# Patient Record
Sex: MALE | Race: WHITE
[De-identification: names, ages, dates, MRNs, and addresses within clinical notes are randomized per-mention and may not be internally consistent; named-entity substitution may affect disease eponyms.]

---

## 2017-12-03 ENCOUNTER — HOSPITAL ENCOUNTER (EMERGENCY)
Dept: HOSPITAL 46 - ED | Age: 52
Discharge: HOME | End: 2017-12-03
Payer: OTHER GOVERNMENT

## 2017-12-03 VITALS — BODY MASS INDEX: 40.09 KG/M2 | WEIGHT: 280.01 LBS | HEIGHT: 70 IN

## 2017-12-03 DIAGNOSIS — R59.0: Primary | ICD-10-CM

## 2017-12-03 DIAGNOSIS — Z79.899: ICD-10-CM

## 2017-12-03 DIAGNOSIS — Z98.890: ICD-10-CM

## 2022-04-18 NOTE — XMS
PreManage Notification: MURRAY MCCLAIN MRN:[id number]
 
Security Information
 
Security Events
No recent Security Events currently on file
 
 
 
CRITERIA MET
------------
- PDMP
 
 
CARE PROVIDERS
-------------------------------------------------------------------------------------
YANIRA MORAES      Nurse Practitioner: Family     Current
 
PHONE: Unknown
-------------------------------------------------------------------------------------
 
Krysta has no Care Guidelines for this patient.
 
EALBERTO VISIT COUNT (12 MO.)
-------------------------------------------------------------------------------------
1 [facility]
-------------------------------------------------------------------------------------
TOTAL 1
-------------------------------------------------------------------------------------
NOTE: Visits indicate total known visits.
 
ED/UCC VISIT TRACKING (12 MO.)
-------------------------------------------------------------------------------------
04/18/2022 12:52
[facility]
 
TYPE: Emergency
 
COMPLAINT:
- HIGH BLOOD SUGAR
-------------------------------------------------------------------------------------
 
 
INPATIENT VISIT TRACKING (12 MO.)
No inpatient visits to display in this time frame
 
[email or web address]

## 2022-04-19 NOTE — XMS
PreManage Notification: MURRAY MCCLAIN MRN:[id number]
 
Security Information
 
Security Events
No recent Security Events currently on file
 
 
 
CRITERIA MET
------------
- [facility] - 2 Visits in 30 Days
- PDMP
 
 
CARE PROVIDERS
-------------------------------------------------------------------------------------
YANIRA MORAES      Nurse Practitioner: Family     Current
 
PHONE: Unknown
-------------------------------------------------------------------------------------
 
Krysta has no Care Guidelines for this patient.
 
EALBERTO VISIT COUNT (12 MO.)
-------------------------------------------------------------------------------------
2 [facility]
-------------------------------------------------------------------------------------
TOTAL 2
-------------------------------------------------------------------------------------
NOTE: Visits indicate total known visits.
 
ED/UCC VISIT TRACKING (12 MO.)
-------------------------------------------------------------------------------------
04/19/2022 20:19
[facility]
 
TYPE: Emergency
 
COMPLAINT:
- DIABETIC BLOOD SUGAR PROBLEM
-------------------------------------------------------------------------------------
04/18/2022 12:52
[facility]
 
TYPE: Emergency
 
COMPLAINT:
- HIGH BLOOD SUGAR
-------------------------------------------------------------------------------------
 
 
INPATIENT VISIT TRACKING (12 MO.)
No inpatient visits to display in this time frame
 
[email or web address]

## 2023-08-25 NOTE — NUR
PT UP DATED ON PLAN OF CARE AND ANTICIPATED TIME OF PROCEEDURE. PT VERBALIZES
UNDERSTANDING AND STATES HIS QUESTIONS HAVE BEEN ANSWERED. PT DENIES
ADDITIONAL REQUESTS OR COMPLAINTS. CALL LIGHT WITHIN REACH. FAMILY AT BEDSIDE.

## 2023-08-25 NOTE — OR
[facility]
                                    [address], [place]  [address]
_________________________________________________________________________________________
                                                                 Signed   
 
 
DATE OF OPERATION:
08/25/2023
 
SURGEON:
Amy Blackwood MD
 
PREOPERATIVE DIAGNOSES:
1. Maternal grandfather with colon cancer age 50. 
2. Triple hemorrhoidectomy in 2019.
3. Personal history of multiple hyperplastic polyps in 2012 at age 47.
 
POSTOPERATIVE DIAGNOSES:
1. Triple hemorrhoidectomy scars.
2. 3 mm rectal polyps x3 at 4-8 cm.
3. 8 mm sessile polyp at 75 cm in left colon.
4. 6 mm sessile polyp at 70 cm in left colon.
 
PROCEDURE:
Colonoscopy with hot biopsy.
 
ESTIMATED BLOOD LOSS:
None.
 
INDICATIONS:
Angel is a 58-year-old gentleman, asked to see me for followup colonoscopy.  He had
multiple hyperplastic polyps removed in 2012 at the age of 47 with Dr. Balaji Slater. His
maternal grandfather had colon cancer at age 50.  Dr. Amy Dorantes also performed a
triple hemorrhoidectomy back in 2019.  He said he gets a little burning around the anus
sometimes when going to the bathroom.  He said he has to use laxatives once in a while.
He does not use a fiber product.  In the office, I had given him a pamphlet on
colonoscopy.  We had reviewed that in detail.  He understands the nature of the test.
There is risk including, but not limited to gas bloating, crampy abdominal pain,
bleeding, perforation requiring surgery, and missed diagnosis.  In addition, we asked
him to use a double bowel prep.  That would consist of one gallon of polyethylene
glycol.  Unfortunately, he only used one-half gallon of the polyethylene glycol.
Nevertheless, his prep was actually pretty good today.  In addition he has to use
tramadol on a daily basis along with a little marijuana to sleep.  He also has a very
full round face, heavy neck, chest and abdomen.  He has a full face beard.  Therefore,
we had asked that we have monitored anesthesia care with propofol infusion.  That proved
to be a mares decision.  He had expressed understanding and wished to proceed. 
 
PROCEDURE NOTE:
 
    Electronically Signed By: AMY BLACKWOOD MD  08/25/23 1043
_________________________________________________________________________________________
PATIENT NAME:     ANGEL MCCLAIN                  
MEDICAL RECORD #: [id number]            OPERATIVE REPORT              
          ACCT #: [id number]  
DATE OF BIRTH:   [birth year]            REPORT #: [id number]      
PHYSICIAN:        AMY BLACKWOOD MD             
PCP:              PITO LISA MD           
REPORT IS CONFIDENTIAL AND NOT TO BE RELEASED WITHOUT AUTHORIZATION
 
 
                                  [facility]
                                    [address], [place]  [address]
_________________________________________________________________________________________
                                                                 Signed   
 
 
Angel was taken into our endoscopy suite and placed in the left lateral decubitus
position.  He was given monitored anesthesia care with propofol infusion per our nurse
anesthetist.  A digital rectal exam was performed and he probably has some very early
pruritus ani.  There were no external hemorrhoids.  He had good sphincter tone.  No
masses.  The scope had been introduced and advanced under direct visualization of the
camera into the cecum itself.  His prep overall was good.  There were several areas of
liquid particulate stool matter.  Most of that was irrigated and suctioned out.  He
really would benefit from one gallon of polyethylene glycol in the future.  We could
easily see the appendiceal orifice and ileocecal valve.  The scope was then slowly
withdrawn.  We took pictures throughout for photodocumentation.  The above-mentioned
polyps were easily removed with the help of hot biopsy forceps.  We could also see
previous polypectomy sites, particularly in the rectum.  He did have some diverticula in
the sigmoid colon.  They were moderate in size, few in number and scattered about.  In
the rectum, the scope had been retroflexed and I can see the three scars from his triple
hemorrhoidectomy in 2019.  There was very little in the way of any internal hemorrhoid
tissue.  After this, the scope was anteflexed.  The gas was suctioned out and the
colonoscope removed.  Angel tolerated the procedure quite well. 
 
RECOMMENDATIONS:
I will see Angel back in my office in 7 to 14 days to review his results.  He will be
on the five year plan due to his family history.  In the future, he should probably use
a double bowel prep. 
 
 
 
            ________________________________________
            Amy Blackwood MD 
 
 
ALB/MODL
Job #:  [id number]
DD:  08/25/2023 09:26:23
DT:  08/25/2023 09:55:30
 
cc:            Amy Blackwood MD 
 
               [facility], [facility] 
 
               Pito Lisa MD
 
 
Copies:  AMY BLACKWOOD MD
 
    Electronically Signed By: AMY BLACKWOOD MD  08/25/23 1043
_________________________________________________________________________________________
PATIENT NAME:     ANGEL MCCLAIN                  
MEDICAL RECORD #: [id number]            OPERATIVE REPORT              
          ACCT #: [id number]  
DATE OF BIRTH:   [birth year]            REPORT #: [id number]      
PHYSICIAN:        AMY BLACKWOOD MD             
PCP:              PITO LISA MD           
REPORT IS CONFIDENTIAL AND NOT TO BE RELEASED WITHOUT AUTHORIZATION
 
 
                                  [address]  [address]
_________________________________________________________________________________________
                                                                 Signed   
 
 
         PITO LISA MD
~
 
 
 
 
 
 
 
 
 
 
 
 
 
 
 
 
 
 
 
 
 
 
 
 
 
 
 
 
 
 
 
 
 
 
 
 
 
 
 
 
 
    Electronically Signed By: AMY BLACKWOOD MD  08/25/23 1043
_________________________________________________________________________________________
PATIENT NAME:     ANGEL MCCLAIN                  
MEDICAL RECORD #: [id number]            OPERATIVE REPORT              
          ACCT #: [id number]  
DATE OF BIRTH:   [birth year]            REPORT #: [id number]      
PHYSICIAN:        AMY BLACKWOOD MD             
PCP:              PITO LISA MD           
REPORT IS CONFIDENTIAL AND NOT TO BE RELEASED WITHOUT AUTHORIZATION

## 2023-08-25 NOTE — NUR
08/25/23 0917 Lily Patel
0911- PT ARRIVES TO PACU NONAROUSABLE TO STIMULI. RESP EVEN AND
UNLABORED. OXYGEN SAT LOW TO MID 90'S ON 2L VIA CO2 NC. PT HAS A HX OF
SLEEP APNEA AND DID NOT BRING CPAP TODAY.

## 2023-08-29 NOTE — PATH
[facility]
                                    [address], [place]  [address]
_________________________________________________________________________________________
                                                                 Signed   
 
 
 
SPECIMEN(S): A RECTAL POLYP AT 4 CM
SPECIMEN(S): B LEFT COLON POLYPS AT 75 CM
SPECIMEN(S): C LEFT COLON POLYP AT 70 CM
 
SPECIMEN SOURCE:
A. RECTAL POLYP AT 4 CM
B. LEFT COLON POLYPS AT 75 CM
C. LEFT COLON POLYP AT 70 CM
 
CLINICAL HISTORY:
Follow-up, history of colon polyps, family history of colon cancer.  Polyps x3.
 
FINAL PATHOLOGIC DIAGNOSIS:
A. Rectal polyp at 4 cm:
-  Hyperplastic polyp (one fragment).
B. Left colon polyps at 75 cm:
-  Hyperplastic polyps (two fragments).
C. Left colon polyp at 70 cm:
-  Hyperplastic polyp (two fragments).
JVR:[facility]
 
MICROSCOPIC EXAMINATION:
Histologic sections of all submitted blocks are examined by light microscopy.  
These findings, together with the gross examination, support the pathologic 
diagnosis. 
 
GROSS DESCRIPTION:
A. The specimen, labeled and designated "MELISSA Mcclain, " and designated on the 
requisition "colon rectum polypectomy at 4 cm," is received in formalin and 
consists of 1 tan soft tissue fragment 
measuring 0.2 x 0.4 cm and submitted entirely in (A1).
B. The specimen, labeled and designated "MELISSA Mcclain, " and designated on the 
requisition "colon, descending/left polypectomy at 75 cm x 2 polyps," is 
received in formalin and consists of 2 tan soft 
polypoid tissue fragments measuring 0.3 x 0.4 cm in greatest dimension.  
Specimens are submitted entirely in (B1). 
C. The specimen, labeled and designated "MELISSA Mcclain, " and designated on the 
requisition "colon, descending left polypectomy at 70 cm," is received in 
formalin and consists of 2 tan soft tissue 
fragments measuring 0.2 x 0.3 cm and submitted entirely in (C1).
[facility]MA  (under the direct supervision of a pathologist)
 
                                                                                    
_________________________________________________________________________________________
PATIENT NAME:     MURRAY MCCLAIN                  
MEDICAL RECORD #: [id number]            PATHOLOGY                     
          ACCT #: [id number]       ACCESSION #: [id number]     
DATE OF BIRTH:   [birth year]            REPORT #: [id number]       
PHYSICIAN:        PHILIP LEMONS              
PCP:              SRIRAM LION MD           
REPORT IS CONFIDENTIAL AND NOT TO BE RELEASED WITHOUT AUTHORIZATION
 
 
                                  [facility]
                                    [address], [place]  [address]
_________________________________________________________________________________________
                                                                 Signed   
 
 
 
The Gross Description was prepared using a voice recognition system. The report 
was reviewed for accuracy; however, sound-alike word errors, addition and/or 
deletions may occur. If there is any 
question about this report, please contact Client Services.
 
PERFORMING LABORATORY:
Technical component was performed by [facility], [address] [place] [id number] (CLIA# [id number]).  Professional interpretation [facility] 
[facility], 
[address] [place] [id number] (CLIA#: [id number]).
 
Diagnostician:  Curtis Peterson MD
Pathologist
Electronically Signed 08/29/2023
 
 
Copies:                                
~
 
 
 
 
 
 
 
 
 
 
 
 
 
 
 
 
 
 
 
 
 
 
 
 
                                                                                    
_________________________________________________________________________________________
PATIENT NAME:     MURRAY MCCLAIN                  
MEDICAL RECORD #: [id number]            PATHOLOGY                     
          ACCT #: [id number]       ACCESSION #: [id number]     
DATE OF BIRTH:   [birth year]            REPORT #: [id number]       
PHYSICIAN:        PHILIP LEOMNS              
PCP:              SRIRAM LION MD           
REPORT IS CONFIDENTIAL AND NOT TO BE RELEASED WITHOUT AUTHORIZATION